# Patient Record
(demographics unavailable — no encounter records)

---

## 2024-10-26 NOTE — HEALTH RISK ASSESSMENT
[Very Good] : ~his/her~  mood as very good [No] : In the past 12 months have you used drugs other than those required for medical reasons? No [No falls in past year] : Patient reported no falls in the past year [0] : 2) Feeling down, depressed, or hopeless: Not at all (0) [PHQ-2 Negative - No further assessment needed] : PHQ-2 Negative - No further assessment needed [Never] : Never [No Retinopathy] : No retinopathy [Patient reported colonoscopy was normal] : Patient reported colonoscopy was normal [HIV test declined] : HIV test declined [Hepatitis C test declined] : Hepatitis C test declined [None] : None [] :  [Fully functional (bathing, dressing, toileting, transferring, walking, feeding)] : Fully functional (bathing, dressing, toileting, transferring, walking, feeding) [Fully functional (using the telephone, shopping, preparing meals, housekeeping, doing laundry, using] : Fully functional and needs no help or supervision to perform IADLs (using the telephone, shopping, preparing meals, housekeeping, doing laundry, using transportation, managing medications and managing finances) [Seat Belt] :  uses seat belt [With Family] : lives with family [Feels Safe at Home] : Feels safe at home [de-identified] : walking, golf [de-identified] : low fat [[id number]] : 0 [EyeExamDate] : 2023 [Change in mental status noted] : No change in mental status noted [Language] : denies difficulty with language [Behavior] : denies difficulty with behavior [High Risk Behavior] : no high risk behavior [Reports changes in hearing] : Reports no changes in hearing [Reports changes in vision] : Reports no changes in vision [Reports changes in dental health] : Reports no changes in dental health [MammogramDate] : 6/2024 [PapSmearComments] : S/P total hysterectomy [BoneDensityDate] : 2023 [BoneDensityComments] : osteopenia [ColonoscopyDate] : 2023 [HepatitisCDate] : neg 2019

## 2024-10-26 NOTE — PLAN
[FreeTextEntry1] :  HCM )  follow w/ dental, ophthalmology/optometry as recommended. Diet low calorie NCS low fat cholesterol diet  EKG done today at this office for HCM, hyperlipidemia; NSR No acute ST-T changes [facility] colonoscopy up to date flu shot given today  Hyperlipidemia cont' low fat/chol diet, con't statin therapy livalo 2mg daily pre DM cont' NCS diet, monitor HbA1c con't calcium 1200-1500mg and vit D 5235-7081 IU daily and regular weight bearing exercise for ostepenia RTC in 6-7 month

## 2024-10-26 NOTE — PHYSICAL EXAM
[No Acute Distress] : no acute distress [Well Nourished] : well nourished [Well Developed] : well developed [Well-Appearing] : well-appearing [Normal Sclera/Conjunctiva] : normal sclera/conjunctiva [PERRL] : pupils equal round and reactive to light [EOMI] : extraocular movements intact [Normal Outer Ear/Nose] : the outer ears and nose were normal in appearance [No JVD] : no jugular venous distention [No Lymphadenopathy] : no lymphadenopathy [Supple] : supple [Thyroid Normal, No Nodules] : the thyroid was normal and there were no nodules present [No Respiratory Distress] : no respiratory distress  [No Accessory Muscle Use] : no accessory muscle use [Clear to Auscultation] : lungs were clear to auscultation bilaterally [Normal Rate] : normal rate  [Regular Rhythm] : with a regular rhythm [Normal S1, S2] : normal S1 and S2 [No Murmur] : no murmur heard [No Edema] : there was no peripheral edema [No Extremity Clubbing/Cyanosis] : no extremity clubbing/cyanosis [Soft] : abdomen soft [Non Tender] : non-tender [Non-distended] : non-distended [Normal Anterior Cervical Nodes] : no anterior cervical lymphadenopathy [Grossly Normal Strength/Tone] : grossly normal strength/tone [No Rash] : no rash [Coordination Grossly Intact] : coordination grossly intact [Normal Gait] : normal gait [Normal Affect] : the affect was normal [Alert and Oriented x3] : oriented to person, place, and time [Normal Insight/Judgement] : insight and judgment were intact [Normal Appearance] : normal in appearance [No Masses] : no palpable masses

## 2025-05-01 NOTE — HISTORY OF PRESENT ILLNESS
[FreeTextEntry1] : HLD prediabetes osteopenia [de-identified] : pt had left wrist fracture while playing golf, fell while walking. pt had surgery done and going for another surgery on 6/4 to take out the pin.  Otherwise pt has been doing well without any acute illness, compliant with all meds.

## 2025-05-01 NOTE — PHYSICAL EXAM
[No Acute Distress] : no acute distress [Well-Appearing] : well-appearing [No Lymphadenopathy] : no lymphadenopathy [Supple] : supple [Thyroid Normal, No Nodules] : the thyroid was normal and there were no nodules present [No Respiratory Distress] : no respiratory distress  [No Accessory Muscle Use] : no accessory muscle use [Clear to Auscultation] : lungs were clear to auscultation bilaterally [Normal Rate] : normal rate  [Regular Rhythm] : with a regular rhythm [Normal S1, S2] : normal S1 and S2 [No Edema] : there was no peripheral edema [No Extremity Clubbing/Cyanosis] : no extremity clubbing/cyanosis [Soft] : abdomen soft [Non Tender] : non-tender [Non-distended] : non-distended [Normal Anterior Cervical Nodes] : no anterior cervical lymphadenopathy [Coordination Grossly Intact] : coordination grossly intact [Normal Gait] : normal gait [Normal Affect] : the affect was normal [Alert and Oriented x3] : oriented to person, place, and time [Normal Insight/Judgement] : insight and judgment were intact

## 2025-05-01 NOTE — REVIEW OF SYSTEMS
[Negative] : Heme/Lymph [FreeTextEntry9] : left wrist fracture S/P ORIF, going for pin removal surgery

## 2025-05-01 NOTE — PLAN
[FreeTextEntry1] :  recent left wrist fracture from fall, osteopenia, repeat DEXA in June [facility] Rx'd for June Hyperlipidemia cont' low fat/chol diet, con't statin therapy livalo 2mg daily under control prediabetes improved cont' NCS diet con't calcium 1200-1500mg and vit D 8662-9069 IU daily and regular weight bearing exercise for osteopenia RTC for medical clearance

## 2025-05-29 NOTE — PLAN
[FreeTextEntry1] :  pt is a 64 year old female with prediabetes HLD osteopenia who is going for orthopedic hardware removal surgery on 6/11/25 under sedation. pt has been medically stable, and asymptomatic pt is medically cleared for the proposed surgery under GA /IV sedation

## 2025-05-29 NOTE — HISTORY OF PRESENT ILLNESS
[FreeTextEntry1] : pre-op evaluation [de-identified] : pt has been doing well without any acute illness or injury, compliant with all meds. pt is going for hardware removal surgery on 6/11/25 S/P left wrist fracture w/ ORIF. pt has no complaints at this time.

## 2025-05-29 NOTE — PHYSICAL EXAM
[No Acute Distress] : no acute distress [Well-Appearing] : well-appearing [Supple] : supple [No Respiratory Distress] : no respiratory distress  [No Accessory Muscle Use] : no accessory muscle use [Clear to Auscultation] : lungs were clear to auscultation bilaterally [Normal Rate] : normal rate  [Regular Rhythm] : with a regular rhythm [Normal S1, S2] : normal S1 and S2 [No Edema] : there was no peripheral edema [Soft] : abdomen soft [Non Tender] : non-tender [Non-distended] : non-distended [Grossly Normal Strength/Tone] : grossly normal strength/tone [Normal Gait] : normal gait [Normal Affect] : the affect was normal [Alert and Oriented x3] : oriented to person, place, and time